# Patient Record
Sex: MALE | Race: WHITE
[De-identification: names, ages, dates, MRNs, and addresses within clinical notes are randomized per-mention and may not be internally consistent; named-entity substitution may affect disease eponyms.]

---

## 2018-04-06 ENCOUNTER — HOSPITAL ENCOUNTER (EMERGENCY)
Dept: HOSPITAL 25 - UCCORT | Age: 23
Discharge: HOME | End: 2018-04-06
Payer: COMMERCIAL

## 2018-04-06 VITALS — SYSTOLIC BLOOD PRESSURE: 115 MMHG | DIASTOLIC BLOOD PRESSURE: 84 MMHG

## 2018-04-06 DIAGNOSIS — K21.9: Primary | ICD-10-CM

## 2018-04-06 PROCEDURE — 99202 OFFICE O/P NEW SF 15 MIN: CPT

## 2018-04-06 PROCEDURE — G0463 HOSPITAL OUTPT CLINIC VISIT: HCPCS

## 2018-04-06 NOTE — UC
Abdominal Pain Male HPI





- HPI Summary


HPI Summary: 





Pt presents with mild luq discomfort for the last week. He tells me that about 

1.5 weeks ago he had the "stomach bug" with diarrhea and a day of vomiting. 

Those symptoms have cleared. Now he complains of mild "gurgling" in his stomach 

with lots of "burping". Some nausea occasionally. When asked about his diet, he 

says he eats a lot fried food, hamburgers, pizza, and different types of subs. 

He has not taken anything OTC for this. Denies fever, chills, chest pain, SOB.











- History of Current Complaint


Chief Complaint: UCGI


Stated Complaint: STOMACH ACHE/VOMMITTING


Time Seen by Provider: 04/06/18 15:58


Hx Obtained From: Patient


Onset/Duration: Gradual Onset


Severity Initially: Mild


Severity Currently: Mild


Pain Intensity: 1


Pain Scale Used: 0-10 Numeric





- Allergies/Home Medications


Allergies/Adverse Reactions: 


 Allergies











Allergy/AdvReac Type Severity Reaction Status Date / Time


 


No Known Allergies Allergy   Verified 04/06/18 15:38














PMH/Surg Hx/FS Hx/Imm Hx


Previously Healthy: Yes





- Surgical History


Surgical History: None





- Family History


Known Family History: Positive: None





- Social History


Occupation: Student


Lives: Dormitory/Roommates


Alcohol Use: Rare


Substance Use Type: None


Smoking Status (MU): Never Smoked Tobacco





Review of Systems


Constitutional: Negative


Skin: Negative


Respiratory: Negative


Cardiovascular: Negative


Gastrointestinal: Abdominal Pain


Musculoskeletal: Negative


Neurological: Negative


Psychological: Negative


All Other Systems Reviewed And Are Negative: Yes





Physical Exam





- Summary


Physical Exam Summary: 





GENERAL: NAD. WDWN. No pain distress.


SKIN: No rashes, sores, ulcers, masses, lesions.


NECK: Supple. Nontender. No lymphadenopathy. 


CHEST:  CTAB. No r/r/w. No accessory muscle use. Breathing comfortably and in 

no distress.


CV:  RRR. Without m/r/g. Pulses intact. Brisk cap refill.


ABDOMEN:  Soft. NTTP. No distention or guarding. No organomegaly. No CVA 

tenderness. Bowel sounds present x4.


NEURO: Alert. CN II-XII grossly intact.


PSYCH: Age appropriate behavior.


Triage Information Reviewed: Yes


Vital Signs: 


 Initial Vital Signs











Temp  97.6 F   04/06/18 15:36


 


Pulse  102   04/06/18 15:36


 


Resp  16   04/06/18 15:36


 


BP  115/84   04/06/18 15:36


 


Pulse Ox  98   04/06/18 15:36














Abd Pain Male Course/Dx





- Course


Course Of Treatment: Suspect acid reflux or esophagitis from recent stomach 

bug. Advised to try OTC zantac daily - go to ED if symptoms persist or worsen.





- Differential Dx/Clinical Impression


Provider Diagnoses: GERD





Discharge





- Sign-Out/Discharge


Documenting (check all that apply): Discharge





- Discharge Plan


Condition: Stable


Disposition: HOME


Prescriptions: 


Ranitidine TAB (NF) [Zantac TAB (NF)] 150 mg PO DAILY #14 tab


Patient Education Materials:  Diet for Stomach Ulcers and Gastritis (ED), 

Gastroesophageal Reflux Disease (ED)


Referrals: 


Non Staff,Doctor [Primary Care Provider] - 


Additional Instructions: 


If you develop a fever, shortness of breath, chest pain, new or worsening 

symptoms - please call your PCP or go to the ED.


 








- Billing Disposition and Condition


Condition: STABLE


Disposition: HOME

## 2018-04-30 ENCOUNTER — HOSPITAL ENCOUNTER (EMERGENCY)
Dept: HOSPITAL 25 - UCCORT | Age: 23
Discharge: HOME | End: 2018-04-30
Payer: COMMERCIAL

## 2018-04-30 VITALS — SYSTOLIC BLOOD PRESSURE: 132 MMHG | DIASTOLIC BLOOD PRESSURE: 76 MMHG

## 2018-04-30 DIAGNOSIS — Z51.89: Primary | ICD-10-CM

## 2018-04-30 DIAGNOSIS — R10.10: ICD-10-CM

## 2018-04-30 PROCEDURE — 99211 OFF/OP EST MAY X REQ PHY/QHP: CPT

## 2018-04-30 PROCEDURE — G0463 HOSPITAL OUTPT CLINIC VISIT: HCPCS

## 2018-04-30 NOTE — ED
Abdominal Pain/Male





- HPI Summary


HPI Summary: 





23 yr old with complaint of epigastric and left upper quad pain from time to 

time.  He has had no pain in over a day now.  He last drank ETOH friday night 

and had some intermittent pain yesterday. No NVD.  He has some diarrhea late 

March, followed by some upper abd pain, and came here, was put on H2 blocker, 

and he states it had minimal effect.  Over the past three weeks he has had some 

intermittent brief symptoms of discomfort in the upper abdomen.  No NVD.  No 

blood in stool, no melena.  His father has a history of acid reflux.  he has 

not had any CP or SOB.  He has not had worse symptoms with drinking ETOH or 

eating.  





He is from the [facility].  He is returning home in the next couple of 

weeks from School. 





- History of Current Complaint


Chief Complaint: UCAbdominalPain


Stated Complaint: ABD PAIN RECHECK


Time Seen by Provider: 04/30/18 18:25


Pain Intensity: 0





- Allergies/Home Medications


Allergies/Adverse Reactions: 


 Allergies











Allergy/AdvReac Type Severity Reaction Status Date / Time


 


No Known Allergies Allergy   Verified 04/30/18 18:12














PMH/Surg Hx/FS Hx/Imm Hx


Infectious Disease History: No


Infectious Disease History: 


   Denies: Traveled Outside the  in Last 30 Days





- Family History


Known Family History: Positive: None





- Social History


Occupation: Student


Lives: With Family


Alcohol Use: Occasionally


Substance Use Type: Reports: None


Smoking Status (MU): Never Smoked Tobacco





Review of Systems


Constitutional: Negative


Positive: Abdominal Pain.  Negative: Vomiting, Diarrhea, Nausea


All Other Systems Reviewed And Are Negative: Yes





Physical Exam


Triage Information Reviewed: Yes


Vital Signs On Initial Exam: 


 Initial Vitals











Temp Pulse Resp BP Pulse Ox


 


 98.3 F   81   16   132/76   100 


 


 04/30/18 18:13  04/30/18 18:13  04/30/18 18:13  04/30/18 18:13  04/30/18 18:13











Vital Signs Reviewed: Yes


Appearance: Positive: Well-Appearing, No Pain Distress


Skin: Positive: Warm, Skin Color Reflects Adequate Perfusion


Head/Face: Positive: Normal Head/Face Inspection


Eyes: Positive: EOMI


ENT: Positive: Normal ENT inspection


Neck: Positive: Supple, Nontender


Respiratory/Lung Sounds: Positive: Clear to Auscultation, Breath Sounds Present


Cardiovascular: Positive: RRR.  Negative: Murmur


Abdomen Description: Positive: Nontender, No Organomegaly, Soft.  Negative: CVA 

Tenderness (R), CVA Tenderness (L), Distended, Guarding


Musculoskeletal: Positive: Strength/ROM Intact


Neurological: Positive: Sensory/Motor Intact, Alert, Oriented to Person Place, 

Time, CN Intact II-III, Normal Gait


Psychiatric: Positive: Normal





- [place]ah Coma Scale


Best Eye Response: 4 - Spontaneous


Best Motor Response: 6 - Obeys Commands


Best Verbal Response: 5 - Oriented


Coma Scale Total: 15





Diagnostics





- Vital Signs


 Vital Signs











  Temp Pulse Resp BP Pulse Ox


 


 04/30/18 18:13  98.3 F  81  16  132/76  100














- Laboratory


Lab Statement: Any lab studies that have been ordered have been reviewed, and 

results considered in the medical decision making process.





Abdominal Pain Fem Course/Dx





- Course


Course Of Treatment: 23 yr old male with upper abdominal pain as recent as 

yesterday, but none in over a day, and he has benign exam and no trouble with 

food tolerance.  At this point I have told him to go to the ER for any return 

of symptoms and further work up can be done there.   Etiology for symptoms not 

known at this point, but he has benign exam and is in no distress now.  He has 

been advised to follow up with his PMD in [place] upon return home at the very 

least if he has no further symptoms.





- Diagnoses


Provider Diagnoses: 


 Abdominal pain








Discharge





- Sign-Out/Discharge


Documenting (check all that apply): Discharge/Admit/Transfer





- Discharge Plan


Condition: Good


Disposition: HOME


Patient Education Materials:  Abdominal Pain (ED)


Referrals: 


[facility] [place] [facility] [place]


[facility] [[facility]


Non Staff,Doctor [Primary Care Provider] - 


Additional Instructions: 


IF YOU HAVE ANY RETURN OF YOUR PAIN YOU SHOULD GO TO THE ER DURING THE EPISODE.

   





[facility] 


[address] 


[address] [place] [address] in [place], [place] 


DirectionsWebsite 


Address: [address], [place] [address] 


Open today  Open 24 hours 


Phone: [phone]





- Billing Disposition and Condition


Condition: GOOD


Disposition: HOME